# Patient Record
Sex: FEMALE | Race: BLACK OR AFRICAN AMERICAN
[De-identification: names, ages, dates, MRNs, and addresses within clinical notes are randomized per-mention and may not be internally consistent; named-entity substitution may affect disease eponyms.]

---

## 2018-05-05 ENCOUNTER — HOSPITAL ENCOUNTER (EMERGENCY)
Dept: HOSPITAL 72 - EMR | Age: 38
Discharge: HOME | End: 2018-05-05
Payer: COMMERCIAL

## 2018-05-05 VITALS — BODY MASS INDEX: 23.05 KG/M2 | HEIGHT: 64 IN | WEIGHT: 135 LBS

## 2018-05-05 VITALS — SYSTOLIC BLOOD PRESSURE: 126 MMHG | DIASTOLIC BLOOD PRESSURE: 76 MMHG

## 2018-05-05 VITALS — DIASTOLIC BLOOD PRESSURE: 76 MMHG | SYSTOLIC BLOOD PRESSURE: 126 MMHG

## 2018-05-05 VITALS — SYSTOLIC BLOOD PRESSURE: 149 MMHG | DIASTOLIC BLOOD PRESSURE: 102 MMHG

## 2018-05-05 DIAGNOSIS — O26.892: ICD-10-CM

## 2018-05-05 DIAGNOSIS — R51: ICD-10-CM

## 2018-05-05 DIAGNOSIS — O23.42: Primary | ICD-10-CM

## 2018-05-05 DIAGNOSIS — O16.2: ICD-10-CM

## 2018-05-05 DIAGNOSIS — Z3A.19: ICD-10-CM

## 2018-05-05 LAB
ADD MANUAL DIFF: NO
ALBUMIN SERPL-MCNC: 3 G/DL (ref 3.4–5)
ALBUMIN/GLOB SERPL: 0.7 {RATIO} (ref 1–2.7)
ALP SERPL-CCNC: 71 U/L (ref 46–116)
ALT SERPL-CCNC: 15 U/L (ref 12–78)
ANION GAP SERPL CALC-SCNC: 12 MMOL/L (ref 5–15)
APPEARANCE UR: (no result)
APTT PPP: YELLOW S
AST SERPL-CCNC: 13 U/L (ref 15–37)
BASOPHILS NFR BLD AUTO: 0.5 % (ref 0–2)
BILIRUB SERPL-MCNC: 0.4 MG/DL (ref 0.2–1)
BUN SERPL-MCNC: 5 MG/DL (ref 7–18)
CALCIUM SERPL-MCNC: 8.6 MG/DL (ref 8.5–10.1)
CHLORIDE SERPL-SCNC: 103 MMOL/L (ref 98–107)
CO2 SERPL-SCNC: 24 MMOL/L (ref 21–32)
CREAT SERPL-MCNC: 0.7 MG/DL (ref 0.55–1.3)
EOSINOPHIL NFR BLD AUTO: 0.4 % (ref 0–3)
ERYTHROCYTE [DISTWIDTH] IN BLOOD BY AUTOMATED COUNT: 13.6 % (ref 11.6–14.8)
GLOBULIN SER-MCNC: 4.3 G/DL
GLUCOSE UR STRIP-MCNC: (no result) MG/DL
HCT VFR BLD CALC: 31.1 % (ref 37–47)
HGB BLD-MCNC: 10.8 G/DL (ref 12–16)
KETONES UR QL STRIP: (no result)
LEUKOCYTE ESTERASE UR QL STRIP: (no result)
LYMPHOCYTES NFR BLD AUTO: 20.8 % (ref 20–45)
MCV RBC AUTO: 88 FL (ref 80–99)
MONOCYTES NFR BLD AUTO: 5 % (ref 1–10)
NEUTROPHILS NFR BLD AUTO: 73.3 % (ref 45–75)
NITRITE UR QL STRIP: NEGATIVE
PH UR STRIP: 6 [PH] (ref 4.5–8)
PLATELET # BLD: 179 K/UL (ref 150–450)
POTASSIUM SERPL-SCNC: 2.6 MMOL/L (ref 3.5–5.1)
PROT UR QL STRIP: (no result)
RBC # BLD AUTO: 3.51 M/UL (ref 4.2–5.4)
SODIUM SERPL-SCNC: 139 MMOL/L (ref 136–145)
SP GR UR STRIP: 1.01 (ref 1–1.03)
UROBILINOGEN UR-MCNC: 4 MG/DL (ref 0–1)
WBC # BLD AUTO: 9 K/UL (ref 4.8–10.8)

## 2018-05-05 PROCEDURE — 80053 COMPREHEN METABOLIC PANEL: CPT

## 2018-05-05 PROCEDURE — 70450 CT HEAD/BRAIN W/O DYE: CPT

## 2018-05-05 PROCEDURE — 80307 DRUG TEST PRSMV CHEM ANLYZR: CPT

## 2018-05-05 PROCEDURE — 99284 EMERGENCY DEPT VISIT MOD MDM: CPT

## 2018-05-05 PROCEDURE — 83690 ASSAY OF LIPASE: CPT

## 2018-05-05 PROCEDURE — 96375 TX/PRO/DX INJ NEW DRUG ADDON: CPT

## 2018-05-05 PROCEDURE — 81003 URINALYSIS AUTO W/O SCOPE: CPT

## 2018-05-05 PROCEDURE — 36415 COLL VENOUS BLD VENIPUNCTURE: CPT

## 2018-05-05 PROCEDURE — 87086 URINE CULTURE/COLONY COUNT: CPT

## 2018-05-05 PROCEDURE — 85025 COMPLETE CBC W/AUTO DIFF WBC: CPT

## 2018-05-05 PROCEDURE — 96374 THER/PROPH/DIAG INJ IV PUSH: CPT

## 2018-05-05 NOTE — EMERGENCY ROOM REPORT
History of Present Illness


General


Chief Complaint:  Headache


Source:  Patient





Present Illness


HPI


Patient presents with reports of nausea vomiting she reports that yesterday she 

was having headaches


And had an episode where she felt"locked up for 3 seconds"she was awake during 

that and slowly got herself out of the sensation


Today continues with nausea and vomiting


Denies any chest pain or shortness of breath denies any focal weakness


Patient is approximately 19 weeks pregnant


 her physician is in [place] however she has made recently moved down 

to [place] area


Allergies:  


Coded Allergies:  


     No Known Allergies (Unverified , 18)





Patient History


Past Medical History:  see triage record


Pertinent Family History:  none


Last Menstrual Period:  2018


Pregnant Now:  Yes


:  6


Para:  5


Reviewed Nursing Documentation:  PMH: Agreed; PSxH: Agreed





Nursing Documentation-PMH


Past Medical History:  No History, Except For


Hx Hypertension:  Yes





Review of Systems


All Other Systems:  negative except mentioned in HPI





Physical Exam





Vital Signs








  Date Time  Temp Pulse Resp B/P (MAP) Pulse Ox O2 Delivery O2 Flow Rate FiO2


 


18 16:58 100.2 101 15 162/95 100 Room Air  





 100.2       








Sp02 EP Interpretation:  reviewed, normal


General Appearance:  well appearing, no apparent distress


Head:  normocephalic, atraumatic


Eyes:  bilateral eye PERRL, bilateral eye EOMI


ENT:  hearing grossly normal, normal pharynx, TMs + canals normal, uvula midline


Neck:  full range of motion, supple, no meningismus, no bony tend


Respiratory:  lungs clear, normal breath sounds, no rhonchi, no respiratory 

distress, no retraction, no accessory muscle use


Cardiovascular #1:  normal peripheral pulses, regular rate, rhythm, no edema, 

no gallop, no JVD, no murmur


Gastrointestinal:  normal bowel sounds, non tender, no organomegaly, non-

distended, no guarding, no hernia, other -  abdomen palpable


Genitourinary:  no CVA tenderness


Musculoskeletal:  normal inspection


Neurologic:  oriented x3, responsive, CNs III-XII nml as tested, motor strength/

tone normal, sensory intact


Psychiatric:  mood/affect normal


Skin:  normal color, no rash, warm/dry, palpation normal


Lymphatic:  normal inspection, no adenopathy





Medical Decision Making


Diagnostic Impression:  


 Primary Impression:  


 Headache


 Additional Impression:  


 UTI (urinary tract infection)


ER Course


Given the patient's history exam and presentation


Imaging studies are initiated


Blood work as well


Patient provided with anti-nausea medication


Continues to do well throughout her stay blood pressure remains appropriate 

here in the ER





Workup is fairly benign patient did have signs of UTI however and is treated 

appropriately requiring close outpatient follow-up


Further ultrasonography or OB follow-up was not made in the emergency room 

given the lack of any lower abdominal cramping or bleeding or discharge





Labs








Test


  18


18:09 18


18:24 18


20:00


 


Urine Color Yellow   


 


Urine Appearance


  Slightly


cloudy 


  


 


 


Urine pH 6 (4.5-8.0)   


 


Urine Specific Gravity


  1.015


(1.005-1.035) 


  


 


 


Urine Protein 3+ (NEGATIVE)   


 


Urine Glucose (UA) 1+ (NEGATIVE)   


 


Urine Ketones 3+ (NEGATIVE)   


 


Urine Occult Blood 2+ (NEGATIVE)   


 


Urine Nitrite


  Negative


(NEGATIVE) 


  


 


 


Urine Bilirubin


  Negative


(NEGATIVE) 


  


 


 


Urine Urobilinogen


  4 MG/DL


(0.0-1.0) 


  


 


 


Urine Leukocyte Esterase 2+ (NEGATIVE)   


 


Urine RBC


  0-2 /HPF (0 -


2) 


  


 


 


Urine WBC


  5-10 /HPF (0 -


2) 


  


 


 


Urine Squamous Epithelial


Cells Many /LPF


(NONE/OCC) 


  


 


 


Urine Bacteria


  Moderate /HPF


(NONE) 


  


 


 


Urine Mucus


  Few /LPF


(NONE/OCC) 


  


 


 


Urine Opiates Screen


  Negative


(NEGATIVE) 


  


 


 


Urine Barbiturates Screen


  Negative


(NEGATIVE) 


  


 


 


Phencyclidine (PCP) Screen


  Negative


(NEGATIVE) 


  


 


 


Urine Amphetamines Screen


  Negative


(NEGATIVE) 


  


 


 


Urine Benzodiazepines Screen


  Negative


(NEGATIVE) 


  


 


 


Urine Cocaine Screen


  Negative


(NEGATIVE) 


  


 


 


Urine Marijuana (THC) Screen


  Positive


(NEGATIVE) 


  


 


 


Sodium Level


  


  139 MMOL/L


(136-145) 


 


 


Potassium Level


  


  2.6 MMOL/L


(3.5-5.1) 


 


 


Chloride Level


  


  103 MMOL/L


() 


 


 


Carbon Dioxide Level


  


  24 MMOL/L


(21-32) 


 


 


Anion Gap


  


  12 mmol/L


(5-15) 


 


 


Blood Urea Nitrogen  5 mg/dL (7-18)  


 


Creatinine


  


  0.7 MG/DL


(0.55-1.30) 


 


 


Estimat Glomerular Filtration


Rate 


  > 60 mL/min


(>60) 


 


 


Glucose Level


  


  106 MG/DL


() 


 


 


Calcium Level


  


  8.6 MG/DL


(8.5-10.1) 


 


 


Total Bilirubin


  


  0.4 MG/DL


(0.2-1.0) 


 


 


Aspartate Amino Transf


(AST/SGOT) 


  13 U/L (15-37) 


  


 


 


Alanine Aminotransferase


(ALT/SGPT) 


  15 U/L (12-78) 


  


 


 


Alkaline Phosphatase


  


  71 U/L


() 


 


 


Total Protein


  


  7.3 G/DL


(6.4-8.2) 


 


 


Albumin


  


  3.0 G/DL


(3.4-5.0) 


 


 


Globulin  4.3 g/dL  


 


Albumin/Globulin Ratio  0.7 (1.0-2.7)  


 


Lipase


  


  79 U/L


() 


 


 


White Blood Count


  


  


  9.0 K/UL


(4.8-10.8)


 


Red Blood Count


  


  


  3.51 M/UL


(4.20-5.40)


 


Hemoglobin


  


  


  10.8 G/DL


(12.0-16.0)


 


Hematocrit


  


  


  31.1 %


(37.0-47.0)


 


Mean Corpuscular Volume   88 FL (80-99) 


 


Mean Corpuscular Hemoglobin


  


  


  30.7 PG


(27.0-31.0)


 


Mean Corpuscular Hemoglobin


Concent 


  


  34.7 G/DL


(32.0-36.0)


 


Red Cell Distribution Width


  


  


  13.6 %


(11.6-14.8)


 


Platelet Count


  


  


  179 K/UL


(150-450)


 


Mean Platelet Volume


  


  


  6.7 FL


(6.5-10.1)


 


Neutrophils (%) (Auto)


  


  


  73.3 %


(45.0-75.0)


 


Lymphocytes (%) (Auto)


  


  


  20.8 %


(20.0-45.0)


 


Monocytes (%) (Auto)


  


  


  5.0 %


(1.0-10.0)


 


Eosinophils (%) (Auto)


  


  


  0.4 %


(0.0-3.0)


 


Basophils (%) (Auto)


  


  


  0.5 %


(0.0-2.0)








Rhythm Strip Diag. Results


EP Interpretation:  yes


Rate:  66


Rhythm:  NSR, no PVC's, no ectopy





CT/MRI/US Diagnostic Results


CT/MRI/US Diagnostic Results :  


   Impression


CT head no acute disease





Last Vital Signs








  Date Time  Temp Pulse Resp B/P (MAP) Pulse Ox O2 Delivery O2 Flow Rate FiO2


 


18 16:58 100.2 101 15 162/95 100 Room Air  





 100.2       








Status:  improved


Disposition:  HOME, SELF-CARE


Condition:  Improved


Scripts


Nitrofurantoin Monohyd/M-Cryst* (MACROBID 100 MG*) 100 Mg Capsule


100 MG ORAL EVERY 12 HOURS, #10 CAP


   Prov: Jose Luis Connolly DO         18 


Metoclopramide Hcl* (REGLAN*) 5 Mg Tablet


5 MG ORAL EVERY 8 HOURS, #10 TAB


   Prov: Jose Luis Connolly DO         18





Additional Instructions:  


Patient is provided with the discharge instructions notified to follow up with 

primary doctor in the next 2-3 days otherwise return to the er with any 

worsening symptoms.


Please note that this report is being documented using DRAGON technology.  This 

can lead to erroneous entry secondary to incorrect interpretation by the 

dictating instrument.











Jose Luis Connolly DO May 5, 2018 17:31

## 2020-06-23 ENCOUNTER — HOSPITAL ENCOUNTER (EMERGENCY)
Dept: HOSPITAL 15 - ER | Age: 40
Discharge: HOME | End: 2020-06-23
Payer: MEDICAID

## 2020-06-23 VITALS — BODY MASS INDEX: 23.9 KG/M2 | WEIGHT: 140 LBS | HEIGHT: 64 IN

## 2020-06-23 VITALS — DIASTOLIC BLOOD PRESSURE: 112 MMHG | SYSTOLIC BLOOD PRESSURE: 162 MMHG

## 2020-06-23 DIAGNOSIS — F17.210: ICD-10-CM

## 2020-06-23 DIAGNOSIS — I10: Primary | ICD-10-CM

## 2020-06-23 LAB
ALBUMIN SERPL-MCNC: 3.4 G/DL (ref 3.4–5)
ALP SERPL-CCNC: 98 U/L (ref 45–117)
ALT SERPL-CCNC: 24 U/L (ref 13–56)
ANION GAP SERPL CALCULATED.3IONS-SCNC: 6 MMOL/L (ref 5–15)
APTT PPP: 27.4 SEC (ref 23.64–32.05)
BILIRUB SERPL-MCNC: 0.2 MG/DL (ref 0.2–1)
BUN SERPL-MCNC: 11 MG/DL (ref 7–18)
BUN/CREAT SERPL: 15.5
CALCIUM SERPL-MCNC: 8.2 MG/DL (ref 8.5–10.1)
CHLORIDE SERPL-SCNC: 105 MMOL/L (ref 98–107)
CO2 SERPL-SCNC: 27 MMOL/L (ref 21–32)
GLUCOSE SERPL-MCNC: 99 MG/DL (ref 74–106)
HCT VFR BLD AUTO: 33.4 % (ref 36–46)
HGB BLD-MCNC: 10.7 G/DL (ref 12.2–16.2)
INR PPP: 0.96 (ref 0.9–1.15)
MAGNESIUM SERPL-MCNC: 2.3 MG/DL (ref 1.6–2.6)
MCH RBC QN AUTO: 26.3 PG (ref 28–32)
MCV RBC AUTO: 82.4 FL (ref 80–100)
NRBC BLD QL AUTO: 0.1 %
POTASSIUM SERPL-SCNC: 3.1 MMOL/L (ref 3.5–5.1)
PROT SERPL-MCNC: 7.7 G/DL (ref 6.4–8.2)
SODIUM SERPL-SCNC: 138 MMOL/L (ref 136–145)

## 2020-06-23 PROCEDURE — 80053 COMPREHEN METABOLIC PANEL: CPT

## 2020-06-23 PROCEDURE — 36415 COLL VENOUS BLD VENIPUNCTURE: CPT

## 2020-06-23 PROCEDURE — 71045 X-RAY EXAM CHEST 1 VIEW: CPT

## 2020-06-23 PROCEDURE — 85610 PROTHROMBIN TIME: CPT

## 2020-06-23 PROCEDURE — 85025 COMPLETE CBC W/AUTO DIFF WBC: CPT

## 2020-06-23 PROCEDURE — 83880 ASSAY OF NATRIURETIC PEPTIDE: CPT

## 2020-06-23 PROCEDURE — 96375 TX/PRO/DX INJ NEW DRUG ADDON: CPT

## 2020-06-23 PROCEDURE — 85730 THROMBOPLASTIN TIME PARTIAL: CPT

## 2020-06-23 PROCEDURE — 99285 EMERGENCY DEPT VISIT HI MDM: CPT

## 2020-06-23 PROCEDURE — 84484 ASSAY OF TROPONIN QUANT: CPT

## 2020-06-23 PROCEDURE — 96374 THER/PROPH/DIAG INJ IV PUSH: CPT

## 2020-06-23 PROCEDURE — 83735 ASSAY OF MAGNESIUM: CPT

## 2020-06-23 PROCEDURE — 93005 ELECTROCARDIOGRAM TRACING: CPT

## 2020-09-27 ENCOUNTER — HOSPITAL ENCOUNTER (EMERGENCY)
Dept: HOSPITAL 15 - ER | Age: 40
Discharge: HOME | End: 2020-09-27
Payer: MEDICAID

## 2020-09-27 VITALS — DIASTOLIC BLOOD PRESSURE: 104 MMHG | SYSTOLIC BLOOD PRESSURE: 154 MMHG

## 2020-09-27 VITALS — BODY MASS INDEX: 22.99 KG/M2 | HEIGHT: 65 IN | WEIGHT: 138 LBS

## 2020-09-27 DIAGNOSIS — T78.3XXA: Primary | ICD-10-CM

## 2020-09-27 DIAGNOSIS — R06.02: ICD-10-CM

## 2020-09-27 DIAGNOSIS — R13.10: ICD-10-CM

## 2020-09-27 DIAGNOSIS — J39.2: ICD-10-CM

## 2020-09-27 DIAGNOSIS — R06.2: ICD-10-CM

## 2020-09-27 DIAGNOSIS — I10: ICD-10-CM

## 2020-09-27 LAB
ALBUMIN SERPL-MCNC: 3.1 G/DL (ref 3.4–5)
ALP SERPL-CCNC: 83 U/L (ref 45–117)
ALT SERPL-CCNC: 18 U/L (ref 13–56)
ANION GAP SERPL CALCULATED.3IONS-SCNC: 7 MMOL/L (ref 5–15)
BILIRUB SERPL-MCNC: 0.3 MG/DL (ref 0.2–1)
BUN SERPL-MCNC: 9 MG/DL (ref 7–18)
BUN/CREAT SERPL: 11.3
CALCIUM SERPL-MCNC: 8.3 MG/DL (ref 8.5–10.1)
CHLORIDE SERPL-SCNC: 108 MMOL/L (ref 98–107)
CO2 SERPL-SCNC: 23 MMOL/L (ref 21–32)
GLUCOSE SERPL-MCNC: 189 MG/DL (ref 74–106)
HCT VFR BLD AUTO: 31.4 % (ref 36–46)
HGB BLD-MCNC: 10.1 G/DL (ref 12.2–16.2)
MCH RBC QN AUTO: 26.2 PG (ref 28–32)
MCV RBC AUTO: 81.4 FL (ref 80–100)
NRBC BLD QL AUTO: 0.1 %
POTASSIUM SERPL-SCNC: 2.8 MMOL/L (ref 3.5–5.1)
PROT SERPL-MCNC: 6.8 G/DL (ref 6.4–8.2)
SODIUM SERPL-SCNC: 138 MMOL/L (ref 136–145)

## 2020-09-27 PROCEDURE — 36415 COLL VENOUS BLD VENIPUNCTURE: CPT

## 2020-09-27 PROCEDURE — 96375 TX/PRO/DX INJ NEW DRUG ADDON: CPT

## 2020-09-27 PROCEDURE — 80053 COMPREHEN METABOLIC PANEL: CPT

## 2020-09-27 PROCEDURE — 85025 COMPLETE CBC W/AUTO DIFF WBC: CPT

## 2020-09-27 PROCEDURE — 99284 EMERGENCY DEPT VISIT MOD MDM: CPT

## 2020-09-27 PROCEDURE — 96374 THER/PROPH/DIAG INJ IV PUSH: CPT

## 2020-09-27 PROCEDURE — 96372 THER/PROPH/DIAG INJ SC/IM: CPT

## 2021-08-27 ENCOUNTER — HOSPITAL ENCOUNTER (EMERGENCY)
Dept: HOSPITAL 15 - ER | Age: 41
Discharge: HOME | End: 2021-08-27
Payer: MEDICAID

## 2021-08-27 VITALS — DIASTOLIC BLOOD PRESSURE: 114 MMHG | SYSTOLIC BLOOD PRESSURE: 187 MMHG

## 2021-08-27 VITALS — BODY MASS INDEX: 24.75 KG/M2 | HEIGHT: 64 IN | WEIGHT: 145 LBS

## 2021-08-27 DIAGNOSIS — Z98.890: ICD-10-CM
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
## 2021-08-27 DIAGNOSIS — S00.83XA: Primary | ICD-10-CM
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
## 2021-08-27 DIAGNOSIS — F17.210: ICD-10-CM

## 2021-08-27 DIAGNOSIS — R55: ICD-10-CM

## 2021-08-27 DIAGNOSIS — I10: ICD-10-CM
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
## 2021-08-27 PROCEDURE — 70450 CT HEAD/BRAIN W/O DYE: CPT

## 2022-10-18 ENCOUNTER — HOSPITAL ENCOUNTER (EMERGENCY)
Dept: HOSPITAL 15 - ER | Age: 42
Discharge: LEFT BEFORE DISCHARGE | End: 2022-10-18
Payer: MEDICAID

## 2022-10-18 VITALS — BODY MASS INDEX: 23.63 KG/M2 | HEIGHT: 67 IN | WEIGHT: 150.58 LBS

## 2022-10-18 VITALS — SYSTOLIC BLOOD PRESSURE: 127 MMHG | DIASTOLIC BLOOD PRESSURE: 76 MMHG

## 2022-10-18 DIAGNOSIS — Z53.21: ICD-10-CM

## 2022-10-18 DIAGNOSIS — M79.602: Primary | ICD-10-CM

## 2022-10-26 ENCOUNTER — HOSPITAL ENCOUNTER (EMERGENCY)
Dept: HOSPITAL 15 - ER | Age: 42
Discharge: LEFT BEFORE DISCHARGE | End: 2022-10-26
Payer: MEDICAID

## 2022-10-26 VITALS — SYSTOLIC BLOOD PRESSURE: 199 MMHG | DIASTOLIC BLOOD PRESSURE: 120 MMHG

## 2022-10-26 VITALS — WEIGHT: 136.69 LBS | HEIGHT: 64 IN | BODY MASS INDEX: 23.34 KG/M2

## 2022-10-26 DIAGNOSIS — Z88.8: ICD-10-CM

## 2022-10-26 DIAGNOSIS — Z98.890: ICD-10-CM

## 2022-10-26 DIAGNOSIS — I16.0: Primary | ICD-10-CM

## 2022-10-26 DIAGNOSIS — R73.9: ICD-10-CM

## 2022-10-26 DIAGNOSIS — F12.90: ICD-10-CM

## 2022-10-26 DIAGNOSIS — F17.210: ICD-10-CM

## 2022-10-26 DIAGNOSIS — M50.10: ICD-10-CM

## 2022-10-26 DIAGNOSIS — I10: ICD-10-CM

## 2022-10-26 LAB
ANION GAP SERPL CALCULATED.3IONS-SCNC: 8 MMOL/L (ref 5–15)
BUN SERPL-MCNC: 12 MG/DL (ref 7–18)
BUN/CREAT SERPL: 13.5
CALCIUM SERPL-MCNC: 9.4 MG/DL (ref 8.5–10.1)
CHLORIDE SERPL-SCNC: 106 MMOL/L (ref 98–107)
CO2 SERPL-SCNC: 25 MMOL/L (ref 21–32)
GLUCOSE SERPL-MCNC: 302 MG/DL (ref 74–106)
HCT VFR BLD AUTO: 39.2 % (ref 36–46)
HGB BLD-MCNC: 13.1 G/DL (ref 12.2–16.2)
MCH RBC QN AUTO: 30.2 PG (ref 28–32)
MCV RBC AUTO: 90.2 FL (ref 80–100)
NRBC BLD QL AUTO: 0 %
POTASSIUM SERPL-SCNC: 3.9 MMOL/L (ref 3.5–5.1)
SODIUM SERPL-SCNC: 139 MMOL/L (ref 136–145)

## 2022-10-26 PROCEDURE — 72040 X-RAY EXAM NECK SPINE 2-3 VW: CPT

## 2022-10-26 PROCEDURE — 80048 BASIC METABOLIC PNL TOTAL CA: CPT

## 2022-10-26 PROCEDURE — 36415 COLL VENOUS BLD VENIPUNCTURE: CPT

## 2022-10-26 PROCEDURE — 99284 EMERGENCY DEPT VISIT MOD MDM: CPT

## 2022-10-26 PROCEDURE — 85025 COMPLETE CBC W/AUTO DIFF WBC: CPT

## 2022-10-26 PROCEDURE — 96374 THER/PROPH/DIAG INJ IV PUSH: CPT
